# Patient Record
Sex: MALE | Race: WHITE | NOT HISPANIC OR LATINO | Employment: FULL TIME | URBAN - METROPOLITAN AREA
[De-identification: names, ages, dates, MRNs, and addresses within clinical notes are randomized per-mention and may not be internally consistent; named-entity substitution may affect disease eponyms.]

---

## 2019-09-09 ENCOUNTER — CONSULT (OUTPATIENT)
Dept: CARDIOLOGY | Facility: CLINIC | Age: 54
End: 2019-09-09

## 2019-09-09 VITALS
DIASTOLIC BLOOD PRESSURE: 68 MMHG | SYSTOLIC BLOOD PRESSURE: 110 MMHG | BODY MASS INDEX: 37.09 KG/M2 | HEART RATE: 68 BPM | WEIGHT: 304.6 LBS | HEIGHT: 76 IN

## 2019-09-09 DIAGNOSIS — R00.2 PALPITATIONS: Primary | ICD-10-CM

## 2019-09-09 DIAGNOSIS — E78.2 MIXED HYPERLIPIDEMIA: ICD-10-CM

## 2019-09-09 DIAGNOSIS — I10 ESSENTIAL HYPERTENSION: ICD-10-CM

## 2019-09-09 PROCEDURE — 99244 OFF/OP CNSLTJ NEW/EST MOD 40: CPT | Performed by: INTERNAL MEDICINE

## 2019-09-09 RX ORDER — CABERGOLINE 0.5 MG/1
0.5 TABLET ORAL 2 TIMES WEEKLY
Refills: 0 | COMMUNITY
Start: 2019-08-01 | End: 2022-03-21

## 2019-09-09 RX ORDER — LOSARTAN POTASSIUM 100 MG/1
100 TABLET ORAL DAILY
Refills: 1 | COMMUNITY
Start: 2019-06-10

## 2019-09-09 RX ORDER — TESTOSTERONE CYPIONATE 200 MG/ML
200 INJECTION, SOLUTION INTRAMUSCULAR 2 TIMES WEEKLY
Refills: 5 | COMMUNITY
Start: 2019-08-08 | End: 2022-03-21

## 2019-09-09 NOTE — PROGRESS NOTES
[facility] AT [address], [address]  [place], [place], [address]    [phone]  WWW.[facility]           OUTPATIENT CLINIC CONSULTATION NOTE    Patient care team:  Patient Care Team:  Josh Jain MD as PCP - General  Duc Chino MD as Surgeon (Neurosurgery)    Requesting Provider and Reason for consultation: The patient is being seen today at the request of Josh Jain MD for palpitations.     Subjective:   Chief complaint:   Chief Complaint   Patient presents with   • Palpitations     Consult   • Shortness of Breath       HPI:    Moe Hays is a 53 y.o. male.  The patient has a history of hypertension, hyperlipidemia, obstructive sleep apnea, and chronic lower back pain.  He presents today for consultation.    He reports that for at least the last 7 years he has had intermittent episodes of palpitations.  He notes that these occur on average once every couple of weeks.  He also reports chronic fatigue, intermittent centralized achy chest pain that occurs with or without exertion, and lower extremity edema that is improved with elevation.  He reports that 2 of his brothers have been diagnosed with atrial fibrillation who have a similar health history as his.  He also notes that his dad had aortic stenosis.  He typically drinks alcohol on the weekends and consumes multiple drinks at one time.  He denies tobacco or drug use.  He did undergo a treadmill stress test approximately 20 years ago when he first developed hypertension.    Review of Systems:  Positive for fatigue, palpitations, atypical chest pain, lower extremity edema  Negative for dyspnea with exertion, orthopnea, PND, lightheadedness, syncope.   All other systems reviewed and are negative.    PFSH:  Patient Active Problem List   Diagnosis   • Degenerative disc disease, lumbar   • Lumbar radiculopathy   • Moderate obesity   • Physical deconditioning   • Palpitations   • Essential hypertension   •  "Mixed hyperlipidemia         Current Outpatient Medications:   •  cabergoline (DOSTINEX) 0.5 MG tablet, TAKE 1 TABLET BY MOUTH TWO TIMES A WEEK, Disp: , Rfl: 0  •  esomeprazole (NexIUM) 40 MG capsule, Take  by mouth., Disp: , Rfl:   •  fenofibrate (TRICOR) 145 MG tablet, Take  by mouth daily., Disp: , Rfl:   •  losartan (COZAAR) 100 MG tablet, Take 100 mg by mouth Daily. 200001, Disp: , Rfl: 1  •  metFORMIN (GLUCOPHAGE) 500 MG tablet, TAKE 1 TABLET BY MOUTH ONCE daily FOR 10 DAYS, THEN INCREASE TO 1 TABLET twice DAILY THEREAFTER, Disp: , Rfl: 5  •  NON FORMULARY, b-12 energy injection weekly, Disp: , Rfl:   •  omega-3 acid ethyl esters (LOVAZA) 1 G capsule, Take  by mouth 2 (two) times a day., Disp: , Rfl:   •  pravastatin (PRAVACHOL) 80 MG tablet, Take  by mouth daily., Disp: , Rfl:   •  Testosterone Cypionate (DEPOTESTOTERONE CYPIONATE) 200 MG/ML injection, INJECT 0.7 ML INTRAMUSCULARLY TWICE WEEKLY, Disp: , Rfl: 5    Allergies   Allergen Reactions   • No Known Drug Allergy        Social History     Socioeconomic History   • Marital status:      Spouse name: Not on file   • Number of children: Not on file   • Years of education: Not on file   • Highest education level: Not on file   Tobacco Use   • Smoking status: Never Smoker   • Smokeless tobacco: Former User     Types: Chew   Substance and Sexual Activity   • Alcohol use: Yes     Comment: Moderate Alcohol use   • Drug use: No   • Sexual activity: Defer     Family History   Problem Relation Age of Onset   • Heart disease Father    • Aortic stenosis Father    • Heart disease Mother    • Atrial fibrillation Brother    • Atrial fibrillation Brother          Objective:   Physical Exam:  /68 (BP Location: Right arm, Patient Position: Sitting)   Pulse 68   Ht 193 cm (76\")   Wt (!) 138 kg (304 lb 9.6 oz)   BMI 37.08 kg/m²   CONSTITUTIONAL: Well-nourished. In no acute distress.   SKIN: Warm and dry. No rashes noted  HEENT: Head is normocephalic and " atraumatic.  Mucous membranes are pink and moist.   NECK: Supple without masses or thyromegaly. There is no jugular venous distention at 30°.  LUNGS: Normal effort. Clear to auscultation bilaterally without wheezing, rhonchi, or rales noted.   CARDIOVASCULAR: The heart has a regular rate and rhythm with a normal S1 and S2. There is no murmur, gallop, rub, or click appreciated.   PERIPHERAL VASCULAR: Carotid upstroke is 2+ bilaterally and without bruits. Radial pulses are 2+ bilaterally. There is no lower extremity edema.   ABDOMEN: Normal bowel sounds.  Soft with no tenderness with palpitation. No hepatosplenomegaly  MUSCULOSKELETAL: Normal gait. No digital cyanosis  NEUROLOGICAL: CN II - XII grossly intact  PSYCHIATRIC: Alert, orientated x 3, appropriate affect     Labs:  BUN   Date Value Ref Range Status   02/12/2014 11 6 - 20 mg/dL Final     Creatinine   Date Value Ref Range Status   02/12/2014 0.9 0.6 - 1.3 mg/dL Final     Potassium   Date Value Ref Range Status   02/12/2014 3.8 3.4 - 5.4 mmol/L Final     ALT (SGPT)   Date Value Ref Range Status   02/12/2014 34 7 - 40 Units/L Final     AST (SGOT)   Date Value Ref Range Status   02/12/2014 26 8 - 33 Units/L Final     No results found for: WBC, HGB, HCT, PLT    No results found for: CHOL  Lab Results   Component Value Date    TRIG 1,030 (H) 02/12/2014     Lab Results   Component Value Date    HDL 40 02/12/2014     Lab Results   Component Value Date    LDL 54 02/12/2014     No components found for: LDLDIRECTC    Outside labs 8/29/2019  BUN 9  Creatinine 1.29  Sodium 142  Potassium 4.8  ALT 22  AST 22  TSH 1.86  Hematocrit 50  Hemoglobin 17.4  Platelets 272    Diagnostic Data:    Procedures    Assessment and Plan:   Moe was seen today for palpitations and shortness of breath.    Diagnoses and all orders for this visit:    Palpitations  -Intermittent lasting minutes to hours and occurring every couple of weeks.  Significant familial history of atrial fibrillation  as well as untreated obstructive sleep apnea, hypertension.  -30-day MCOT to evaluate for arrhythmia.  -Discussed possible triggers for the palpitations with the patient including undertreated FELICIA, alcohol use.  -Encouraged cardiac exercise    Essential hypertension  -At goal, continue losartan.    Mixed hyperlipidemia  -Management per PCP.  -Continue fenofibrate, Lovaza, and pravastatin    - Return in about 6 months (around 3/9/2020).    Scribed for Benji Dumont MD by Ivelisse Yu, STEWART  . 9/9/2019  4:53 PM    I, Benji Dumont MD, personally performed the services as scribed by the above named individual. I have made any necessary edits and it is both accurate and complete.     Benji Dumont MD, MSc, [facility]  Interventional Cardiology  [facility] at [facility]

## 2019-09-18 ENCOUNTER — TELEPHONE (OUTPATIENT)
Dept: CARDIOLOGY | Facility: CLINIC | Age: 54
End: 2019-09-18

## 2019-09-18 DIAGNOSIS — I48.92 ATRIAL FLUTTER WITH RAPID VENTRICULAR RESPONSE (HCC): Primary | ICD-10-CM

## 2019-09-18 NOTE — TELEPHONE ENCOUNTER
LVM requesting return call to discuss monitor results and recommendations per MJS. Will await return call to discuss:    1. Beginning Toprol XL 25 mg daily  2. EP referral to discuss possible flutter ablation   3. Office will call in one week to discuss palpitations response to Toprol XL

## 2019-09-19 RX ORDER — METOPROLOL SUCCINATE 25 MG/1
25 TABLET, EXTENDED RELEASE ORAL DAILY
Qty: 30 TABLET | Refills: 11 | Status: SHIPPED | OUTPATIENT
Start: 2019-09-19 | End: 2019-10-30 | Stop reason: SDUPTHER

## 2019-09-19 NOTE — TELEPHONE ENCOUNTER
Patient returned call. Discussed recommendations per MJS. Patient to begin Toprol XL mg. Pt advised that we will call him in one week to ask about response to medication. Pt will also be set up with EP to discuss possible flutter ablation.

## 2019-09-26 ENCOUNTER — TELEPHONE (OUTPATIENT)
Dept: CARDIOLOGY | Facility: CLINIC | Age: 54
End: 2019-09-26

## 2019-09-26 NOTE — TELEPHONE ENCOUNTER
Patient contacted to review palpitations after starting Toprol XL 25 mg daily. Pt states that he has not started his because his pharmacy has not let him know it was ready. Pt advised to call [facility] pharmacy. Pt to call office in one week after starting medication. Pt agreeable to plan, all questions answered at this time.

## 2019-10-30 RX ORDER — METOPROLOL SUCCINATE 25 MG/1
25 TABLET, EXTENDED RELEASE ORAL DAILY
Qty: 30 TABLET | Refills: 11 | Status: SHIPPED | OUTPATIENT
Start: 2019-10-30 | End: 2021-03-15 | Stop reason: SDUPTHER

## 2019-12-10 ENCOUNTER — CONSULT (OUTPATIENT)
Dept: CARDIOLOGY | Facility: CLINIC | Age: 54
End: 2019-12-10

## 2019-12-10 VITALS
BODY MASS INDEX: 37.14 KG/M2 | HEART RATE: 56 BPM | WEIGHT: 305 LBS | DIASTOLIC BLOOD PRESSURE: 84 MMHG | SYSTOLIC BLOOD PRESSURE: 122 MMHG | HEIGHT: 76 IN

## 2019-12-10 DIAGNOSIS — I48.3 TYPICAL ATRIAL FLUTTER (HCC): Primary | ICD-10-CM

## 2019-12-10 PROCEDURE — 99204 OFFICE O/P NEW MOD 45 MIN: CPT | Performed by: INTERNAL MEDICINE

## 2019-12-10 PROCEDURE — 93000 ELECTROCARDIOGRAM COMPLETE: CPT | Performed by: INTERNAL MEDICINE

## 2019-12-10 RX ORDER — NIACIN 1000 MG
1000 TABLET, EXTENDED RELEASE 24 HR ORAL NIGHTLY
COMMUNITY

## 2019-12-10 NOTE — PROGRESS NOTES
Moe Hays  1965  PCP: Josh Jain MD    SUBJECTIVE:   Moe Hays is a 54 y.o. male seen for a consultation visit regarding the following:     Chief Complaint:   Chief Complaint   Patient presents with   • Atrial Flutter          Consultation is requested by Benji Dumont MD for evaluation of Atrial Flutter        History:  This is a 54-year-old patient referred by Dr. Dumont for further evaluation and management of atrial flutter and atrial fib.  The patient reports having palpitations for many years.  He has approximately 2-3 episodes a week.  They usually last for a few minutes at a time.  He had a work-up with a monitor that showed less than 1% burden overall however, it did document runs of atrial flutter and occasional atrial fibrillation.      Cardiac PMH: (Old records have been reviewed and summarized below)  1.  Palpitations  2.  Atrial flutter  3.  Atrial fibrillation   4.  Diabetes  5.  Hypertension      Past Medical History, Past Surgical History, Family history, Social History, and Medications were all reviewed with the patient today and updated as necessary.       Current Outpatient Medications:   •  cabergoline (DOSTINEX) 0.5 MG tablet, TAKE 1 TABLET BY MOUTH TWO TIMES A WEEK, Disp: , Rfl: 0  •  esomeprazole (NexIUM) 40 MG capsule, Take  by mouth As Needed., Disp: , Rfl:   •  fenofibrate (TRICOR) 145 MG tablet, Take  by mouth daily., Disp: , Rfl:   •  IPAMORELIN ACETATE IJ, Inject  as directed., Disp: , Rfl:   •  losartan (COZAAR) 100 MG tablet, Take 100 mg by mouth Daily. 200001, Disp: , Rfl: 1  •  metFORMIN (GLUCOPHAGE) 500 MG tablet, TAKE 1 TABLET BY MOUTH ONCE daily FOR 10 DAYS, THEN INCREASE TO 1 TABLET twice DAILY THEREAFTER, Disp: , Rfl: 5  •  metoprolol succinate XL (TOPROL-XL) 25 MG 24 hr tablet, Take 1 tablet by mouth Daily., Disp: 30 tablet, Rfl: 11  •  niacin (NIASPAN) 1000 MG CR tablet, Take 1,000 mg by mouth Every Night., Disp: , Rfl:   •  NON FORMULARY, b-12 energy injection  weekly, Disp: , Rfl:   •  omega-3 acid ethyl esters (LOVAZA) 1 G capsule, Take  by mouth 2 (two) times a day., Disp: , Rfl:   •  pravastatin (PRAVACHOL) 80 MG tablet, Take  by mouth daily., Disp: , Rfl:   •  Testosterone Cypionate (DEPOTESTOTERONE CYPIONATE) 200 MG/ML injection, INJECT 0.7 ML INTRAMUSCULARLY TWICE WEEKLY, Disp: , Rfl: 5    Allergies   Allergen Reactions   • No Known Drug Allergy          Past Medical History:   Diagnosis Date   • Cervical disc disorder    • Cervical radiculopathy    • Chronic pain disorder    • Difficulty walking    • Extremity pain    • Hand weakness     Hand Weakness Right   • History of degenerative disc disease    • History of insomnia    • History of numbness    • History of sciatica    • Hyperlipidemia    • Hypertension    • Joint pain    • Low back pain    • Lumbosacral disc disease    • Osteoarthritis    • Tingling      Past Surgical History:   Procedure Laterality Date   • BACK SURGERY     • CHOLECYSTECTOMY     • NECK SURGERY       Family History   Problem Relation Age of Onset   • Heart disease Father    • Aortic stenosis Father    • Heart disease Mother    • Atrial fibrillation Brother    • Atrial fibrillation Brother      Social History     Tobacco Use   • Smoking status: Never Smoker   • Smokeless tobacco: Former User     Types: Chew   Substance Use Topics   • Alcohol use: Yes     Comment: Moderate Alcohol use       ROS:    General: no recent weight loss/gain, weakness or fatigue  Skin: no rashes, lumps, or other skin changes  HEENT: no dizziness, lightheadedness, or vision changes  Respiratory: no cough or hemoptysis  Cardiovascular: + palpitations, and tachycardia  Gastrointestinal: no black/tarry stools or diarrhea  Urinary: no change in frequency or urgency  Peripheral Vascular: no claudication or leg cramps  Musculoskeletal: no muscle or joint pain/stiffness  Psychiatric: no depression or excessive stress  Neurological: no sensory or motor loss, no  "syncope  Hematologic: no anemia, easy bruising or bleeding  Endocrine: no thyroid problems, nor heat or cold intolerance       PHYSICAL EXAM:   /84 (BP Location: Left arm, Patient Position: Sitting)   Pulse 56   Ht 193 cm (76\")   Wt (!) 138 kg (305 lb)   BMI 37.13 kg/m²      Wt Readings from Last 5 Encounters:   12/10/19 (!) 138 kg (305 lb)   09/09/19 (!) 138 kg (304 lb 9.6 oz)   07/07/16 133 kg (293 lb 12.8 oz)   02/18/16 134 kg (296 lb 4.1 oz)   11/16/15 132 kg (290 lb 4.1 oz)     BP Readings from Last 5 Encounters:   12/10/19 122/84   09/09/19 110/68   07/07/16 124/78   02/18/16 130/85   11/16/15 116/76       General-Well Nourished, Well developed  Eyes - PERRLA  Neck- supple, No mass  CV- regular rate and rhythm, no MRG  Lung- clear bilaterally  Abd- soft, +BS  Musc/skel - Norm strength and range of motion  Skin- warm and dry  Neuro - Alert & Oriented x 3, appropriate mood.    Medical problems and test results were reviewed with the patient today.     Results for orders placed or performed in visit on 02/12/14   Testosterone   Result Value Ref Range    Testosterone, Total 123 ng/dL   Comprehensive metabolic panel   Result Value Ref Range    Glucose 94 70 - 100 mg/dL    BUN 11 6 - 20 mg/dL    Creatinine 0.9 0.6 - 1.3 mg/dL    Sodium 141 136 - 145 mmol/L    Potassium 3.8 3.4 - 5.4 mmol/L    Chloride 105 98 - 107 mmol/L    CO2 28 20 - 31 mmol/L    Calcium 9.2 8.7 - 10.4 mg/dL    Alkaline Phosphatase 72 25 - 100 Units/L    AST (SGOT) 26 8 - 33 Units/L    ALT (SGPT) 34 7 - 40 Units/L    Total Bilirubin 0.5 0.3 - 1.2 mg/dL    Total Protein 7.1 6.4 - 8.3 g/dL    Albumin 3.9 3.4 - 4.8 g/dL    eGFR 98 ml/min/1.732    Anion Gap 9 3 - 11 mmol/L   Lipid panel   Result Value Ref Range    Total Cholesterol 346 (H) 0 - 200 mg/dL    Triglycerides 1,030 (H) 0 - 150 mg/dL    HDL Cholesterol 40 40 - 60 mg/dL    LDL Cholesterol  54 0 - 100 mg/dL         Lab Results   Component Value Date    HDL 40 02/12/2014    LDL 54 " 02/12/2014       EKG:  (EKG/Tracing has been independently visualized by me and summarized below)      ECG 12 Lead  Date/Time: 12/10/2019 9:57 AM  Performed by: John Wylie MD  Authorized by: John Wylie MD   Comparison: compared with previous ECG   Similar to previous ECG  Rhythm: sinus bradycardia  Rate: bradycardic  BPM: 56  ST Segments: ST segments normal  T Waves: T waves normal    Clinical impression: normal ECG            ASSESSMENT and PLAN  1.  Atrial flutter-short rare events that he is very symptomatic from.  We will plan for catheter-based ablation  2.  Atrial fibrillation-noted 1 short episode associated with the atrial flutter readdress burden after atrial flutter ablation  3.  Stroke prophylaxis-his overall burden is low therefore it is felt that the risk of full anti-coagulation at this time outweighs the benefit.    Return for after procedure.    1. A. Flutter ablation  2. No Meds to hold         John Wylie M.D., F.A.C.C, F.H.R.S.  Cardiology/Electrophysiology  12/10/19  9:57 AM

## 2019-12-16 PROBLEM — I48.3 TYPICAL ATRIAL FLUTTER (HCC): Status: ACTIVE | Noted: 2019-12-16

## 2020-01-09 ENCOUNTER — PREP FOR SURGERY (OUTPATIENT)
Dept: OTHER | Facility: HOSPITAL | Age: 55
End: 2020-01-09

## 2020-01-12 ENCOUNTER — APPOINTMENT (OUTPATIENT)
Dept: PREADMISSION TESTING | Facility: HOSPITAL | Age: 55
End: 2020-01-12

## 2020-01-12 DIAGNOSIS — I48.3 TYPICAL ATRIAL FLUTTER (HCC): ICD-10-CM

## 2020-01-12 LAB
ALBUMIN SERPL-MCNC: 4.4 G/DL (ref 3.5–5.2)
ALBUMIN/GLOB SERPL: 1.5 G/DL
ALP SERPL-CCNC: 45 U/L (ref 39–117)
ALT SERPL W P-5'-P-CCNC: 27 U/L (ref 1–41)
ANION GAP SERPL CALCULATED.3IONS-SCNC: 12 MMOL/L (ref 5–15)
AST SERPL-CCNC: 31 U/L (ref 1–40)
BILIRUB SERPL-MCNC: 0.6 MG/DL (ref 0.2–1.2)
BUN BLD-MCNC: 10 MG/DL (ref 6–20)
BUN/CREAT SERPL: 8.1 (ref 7–25)
CALCIUM SPEC-SCNC: 9.7 MG/DL (ref 8.6–10.5)
CHLORIDE SERPL-SCNC: 103 MMOL/L (ref 98–107)
CO2 SERPL-SCNC: 26 MMOL/L (ref 22–29)
CREAT BLD-MCNC: 1.23 MG/DL (ref 0.76–1.27)
DEPRECATED RDW RBC AUTO: 45.1 FL (ref 37–54)
ERYTHROCYTE [DISTWIDTH] IN BLOOD BY AUTOMATED COUNT: 13.1 % (ref 12.3–15.4)
GFR SERPL CREATININE-BSD FRML MDRD: 61 ML/MIN/1.73
GLOBULIN UR ELPH-MCNC: 3 GM/DL
GLUCOSE BLD-MCNC: 124 MG/DL (ref 65–99)
HCT VFR BLD AUTO: 49.2 % (ref 37.5–51)
HGB BLD-MCNC: 16.6 G/DL (ref 13–17.7)
MCH RBC QN AUTO: 31.7 PG (ref 26.6–33)
MCHC RBC AUTO-ENTMCNC: 33.7 G/DL (ref 31.5–35.7)
MCV RBC AUTO: 93.9 FL (ref 79–97)
PLATELET # BLD AUTO: 235 10*3/MM3 (ref 140–450)
PMV BLD AUTO: 11.1 FL (ref 6–12)
POTASSIUM BLD-SCNC: 4.2 MMOL/L (ref 3.5–5.2)
PROT SERPL-MCNC: 7.4 G/DL (ref 6–8.5)
RBC # BLD AUTO: 5.24 10*6/MM3 (ref 4.14–5.8)
SODIUM BLD-SCNC: 141 MMOL/L (ref 136–145)
WBC NRBC COR # BLD: 7.03 10*3/MM3 (ref 3.4–10.8)

## 2020-01-12 PROCEDURE — 80053 COMPREHEN METABOLIC PANEL: CPT | Performed by: INTERNAL MEDICINE

## 2020-01-12 PROCEDURE — 36415 COLL VENOUS BLD VENIPUNCTURE: CPT

## 2020-01-12 PROCEDURE — 85027 COMPLETE CBC AUTOMATED: CPT | Performed by: INTERNAL MEDICINE

## 2020-01-12 RX ORDER — FENOFIBRATE 145 MG/1
145 TABLET, COATED ORAL DAILY
Status: ON HOLD | COMMUNITY
End: 2020-01-13 | Stop reason: SDUPTHER

## 2020-01-12 NOTE — DISCHARGE INSTRUCTIONS

## 2020-01-13 ENCOUNTER — HOSPITAL ENCOUNTER (OUTPATIENT)
Facility: HOSPITAL | Age: 55
Discharge: HOME | End: 2020-01-13
Attending: INTERNAL MEDICINE | Admitting: INTERNAL MEDICINE

## 2020-01-13 VITALS
TEMPERATURE: 98.3 F | HEART RATE: 67 BPM | BODY MASS INDEX: 37.53 KG/M2 | RESPIRATION RATE: 12 BRPM | OXYGEN SATURATION: 95 % | HEIGHT: 76 IN | SYSTOLIC BLOOD PRESSURE: 148 MMHG | WEIGHT: 308.2 LBS | DIASTOLIC BLOOD PRESSURE: 92 MMHG

## 2020-01-13 DIAGNOSIS — I48.3 TYPICAL ATRIAL FLUTTER (HCC): ICD-10-CM

## 2020-01-13 PROCEDURE — C1894 INTRO/SHEATH, NON-LASER: HCPCS | Performed by: INTERNAL MEDICINE

## 2020-01-13 PROCEDURE — 25010000002 FENTANYL CITRATE (PF) 100 MCG/2ML SOLUTION: Performed by: INTERNAL MEDICINE

## 2020-01-13 PROCEDURE — C1731 CATH, EP, 20 OR MORE ELEC: HCPCS | Performed by: INTERNAL MEDICINE

## 2020-01-13 PROCEDURE — 93609 INTRA-VNTR MAPG TCHYCAR SITE: CPT | Performed by: INTERNAL MEDICINE

## 2020-01-13 PROCEDURE — S0260 H&P FOR SURGERY: HCPCS | Performed by: INTERNAL MEDICINE

## 2020-01-13 PROCEDURE — 99152 MOD SED SAME PHYS/QHP 5/>YRS: CPT | Performed by: INTERNAL MEDICINE

## 2020-01-13 PROCEDURE — 93653 COMPRE EP EVAL TX SVT: CPT | Performed by: INTERNAL MEDICINE

## 2020-01-13 PROCEDURE — 93621 COMP EP EVL L PAC&REC C SINS: CPT | Performed by: INTERNAL MEDICINE

## 2020-01-13 PROCEDURE — 93005 ELECTROCARDIOGRAM TRACING: CPT | Performed by: INTERNAL MEDICINE

## 2020-01-13 PROCEDURE — 25010000002 MIDAZOLAM PER 1 MG: Performed by: INTERNAL MEDICINE

## 2020-01-13 PROCEDURE — 99153 MOD SED SAME PHYS/QHP EA: CPT | Performed by: INTERNAL MEDICINE

## 2020-01-13 PROCEDURE — C1730 CATH, EP, 19 OR FEW ELECT: HCPCS | Performed by: INTERNAL MEDICINE

## 2020-01-13 PROCEDURE — C1733 CATH, EP, OTHR THAN COOL-TIP: HCPCS | Performed by: INTERNAL MEDICINE

## 2020-01-13 RX ORDER — ACETAMINOPHEN 650 MG/1
650 SUPPOSITORY RECTAL EVERY 4 HOURS PRN
Status: DISCONTINUED | OUTPATIENT
Start: 2020-01-13 | End: 2020-01-13 | Stop reason: HOSPADM

## 2020-01-13 RX ORDER — ONDANSETRON 2 MG/ML
4 INJECTION INTRAMUSCULAR; INTRAVENOUS EVERY 6 HOURS PRN
Status: DISCONTINUED | OUTPATIENT
Start: 2020-01-13 | End: 2020-01-13 | Stop reason: HOSPADM

## 2020-01-13 RX ORDER — MIDAZOLAM HYDROCHLORIDE 1 MG/ML
INJECTION INTRAMUSCULAR; INTRAVENOUS AS NEEDED
Status: DISCONTINUED | OUTPATIENT
Start: 2020-01-13 | End: 2020-01-13 | Stop reason: HOSPADM

## 2020-01-13 RX ORDER — IBUPROFEN 200 MG
400 TABLET ORAL EVERY 6 HOURS PRN
Status: DISCONTINUED | OUTPATIENT
Start: 2020-01-13 | End: 2020-01-13 | Stop reason: HOSPADM

## 2020-01-13 RX ORDER — OXYCODONE HYDROCHLORIDE AND ACETAMINOPHEN 5; 325 MG/1; MG/1
1 TABLET ORAL EVERY 4 HOURS PRN
Status: DISCONTINUED | OUTPATIENT
Start: 2020-01-13 | End: 2020-01-13 | Stop reason: HOSPADM

## 2020-01-13 RX ORDER — FENTANYL CITRATE 50 UG/ML
INJECTION, SOLUTION INTRAMUSCULAR; INTRAVENOUS AS NEEDED
Status: DISCONTINUED | OUTPATIENT
Start: 2020-01-13 | End: 2020-01-13 | Stop reason: HOSPADM

## 2020-01-13 RX ORDER — ACETAMINOPHEN 325 MG/1
650 TABLET ORAL EVERY 4 HOURS PRN
Status: DISCONTINUED | OUTPATIENT
Start: 2020-01-13 | End: 2020-01-13 | Stop reason: HOSPADM

## 2020-01-13 RX ORDER — LIDOCAINE HYDROCHLORIDE 10 MG/ML
INJECTION, SOLUTION EPIDURAL; INFILTRATION; INTRACAUDAL; PERINEURAL AS NEEDED
Status: DISCONTINUED | OUTPATIENT
Start: 2020-01-13 | End: 2020-01-13 | Stop reason: HOSPADM

## 2020-01-13 NOTE — H&P
[facility] at [facility]  CARDIOLOGY CONSULTATION NOTE    Moe Hays  : 1965  MRN:[id number]    Date of Admission:2020  Date of Consultation: 20    PCP: Josh Jain MD    IDENTIFICATION: A 54 y.o. male     Cc:  Aflutter    PROBLEM LIST:   1.  Palpitations  2.  Atrial flutter  3.  Atrial fibrillation   4.  Diabetes  5.  Hypertension  6.  FELICIA,  CPAP       ALLERGIES:   Allergies   Allergen Reactions   • Other Swelling     Cats, eye swellinig   • No Known Drug Allergy        HOME MEDICINES:   Outpatient Medications    cabergoline (DOSTINEX) 0.5 MG tablet     fenofibrate (TRICOR) 145 MG tablet     losartan (COZAAR) 100 MG tablet     metFORMIN (GLUCOPHAGE) 500 MG tablet     metoprolol succinate XL (TOPROL-XL) 25 MG 24 hr tablet     niacin (NIASPAN) 1000 MG CR tablet     omega-3 acid ethyl esters (LOVAZA) 1 G capsule     pravastatin (PRAVACHOL) 80 MG tablet     esomeprazole (NexIUM) 40 MG capsule     IPAMORELIN ACETATE IJ     NON FORMULARY     Testosterone Cypionate (DEPOTESTOTERONE CYPIONATE) 200 MG/ML injection          HPI:   54-year-old white male presents today to [facility] for EP study and radiofrequency ablation of atrial flutter.  The patient began having palpitations months ago underwent evaluation with Dr. Thornton which included a monitor.  The monitor revealed 1% burden of atrial flutter.  Patient was symptomatic with significant palpitations.  Reports she still having these once or twice every few days.  They are very short in duration but are bothersome to him.  He has not had any chest pain or chest tightness suggesting his pectoris.  He denies any strokelike symptoms.  Denies any recent hospitalizations.  Dr. Wylie discussed with the patient on December 10, 2019 options for treatment such as ablation the patient agreed to proceed.      ROS: All systems have been reviewed and are negative with the exception of those mentioned in the HPI and  "problem list above.    Surgical History:   Past Surgical History:   Procedure Laterality Date   • CHOLECYSTECTOMY     • COLONOSCOPY     • ENDOSCOPY     • NECK SURGERY         Social History:   Social History     Socioeconomic History   • Marital status:      Spouse name: Not on file   • Number of children: Not on file   • Years of education: Not on file   • Highest education level: Not on file   Tobacco Use   • Smoking status: Never Smoker   • Smokeless tobacco: Former User     Types: Chew   Substance and Sexual Activity   • Alcohol use: Yes     Alcohol/week: 10.0 standard drinks     Types: 10 Cans of beer per week     Comment: Moderate Alcohol use   • Drug use: No   • Sexual activity: Defer       Family History:   Family History   Problem Relation Age of Onset   • Heart disease Father    • Aortic stenosis Father    • Heart disease Mother    • Atrial fibrillation Brother    • Atrial fibrillation Brother        Objective     /76 (BP Location: Left arm, Patient Position: Lying)   Pulse 62   Temp 98.3 °F (36.8 °C) (Tympanic)   Resp 16   Ht 193 cm (76\")   Wt (!) 140 kg (308 lb 3.3 oz)   SpO2 97%   BMI 37.52 kg/m²   No intake or output data in the 24 hours ending 01/13/20 1037    PHYSICAL EXAM:  Constitutional:  Well-nourished, cooperative, in no acute distress.   Head:  Normocephalic, without obvious abnormality, atraumatic.   Neck: No adenopathy, supple, trachea midline, no thyromegaly, no    carotid bruit, no JVD.   Respiratory:   Clear to auscultation bilaterally; respirations regular, even and unlabored. No wheezes, rales or rhonchi.    Cardiovascular:  Regular rhythm and normal rate, normal S1 and S2, no            murmur, no gallop, no rub, no click.   Pulses: Peripheral pulses are present and equal bilaterally.   GI:   Soft, non-distended. Bowel sounds heard throughout. No organomegaly or masses. Non-tender to palpation, no guarding.   Extremities: No edema, clubbing or cyanosis.   Skin: Skin " is warm and dry. No bleeding, bruising or rash.   Neurological: Alert, oriented to time, person and place. No focal deficits.     Labs/Diagnostic Data  Results from last 7 days   Lab Units 01/12/20  1027   SODIUM mmol/L 141   POTASSIUM mmol/L 4.2   CHLORIDE mmol/L 103   CO2 mmol/L 26.0   BUN mg/dL 10   CREATININE mg/dL 1.23   GLUCOSE mg/dL 124*   CALCIUM mg/dL 9.7         Results from last 7 days   Lab Units 01/12/20  1027   WBC 10*3/mm3 7.03   HEMOGLOBIN g/dL 16.6   HEMATOCRIT % 49.2   PLATELETS 10*3/mm3 235                               I personally reviewed the patient's EKG/Telemetry data  Sinus Bradycardia.       Current Medications:        No current facility-administered medications for this encounter.     Assessment and Plan:     1. Symptotic aflutter  2. PAF: Minimal events.   3. HTN:  Controlled, Cozaar, Toprol.   4. Obesity, Need to  Diet and exercise.   5. Chadsvasc=2.   6. DM Type II      PLAN:   EPS and RFA of Aflutter.  We discussed the procedure in great detail including risks, benefits, and alternatives. The patient understands that risks include bleeding, infection, stroke, MI, cardiac perforation, and even death.  Electronically signed by MANOJ Prabhakar, 01/13/20, 10:03 AM.

## 2020-01-13 NOTE — PROCEDURES
PRE-ELECTROPHYSIOLOGY STUDY DIAGNOSES  1. Typical atrial flutter.     PROCEDURE PERFORMED  1. Electrophysiology testing with right-sided atrial flutter ablation.  2. Left atrial pacing recording from the coronary sinus.  3. Interatrial mapping.  4. Sedation    Anesthesia:    I was present with the patient for the duration of moderate sedation and supervised staff who had no other duties and monitored the patient for the entire procedure     Name of independent trained observer: Mckenna Perez RN  Intra-Service start time: 1423  Intra-Service end time: 1523     Estimated Blood Loss: Less than 10 mL     Specimens: None     PROCEDURE IN DETAIL: The patient was brought into the EP lab in a fasting  state. The right and left groins were prepped and draped in the usual  sterile fashion. Access was obtained in the right femoral vein via the  Seldinger technique over which an 8 and 7-Bhutanese sheath was placed.  Access was obtained in the left femoral vein via the Seldinger technique  over which a 5-Bhutanese sheath was placed. Through the 7-Bhutanese sheath, a  Halo mapping catheter was placed in the high right atrium. Through the  5-Bhutanese sheath, a 5-Bhutanese catheter was placed at the RV apex. Through  the 8-Bhutanese sheath, a large curved 8 mm Blazer II ablation catheter was  placed in the coronary sinus. Pacing, recording and mapping from the left atrium was done. Pacing across the isthmus was done pre-ablation. A Right-sided atrial  flutter line was then performed using 70 W of energy, 60 degree heat for  120 seconds. Multiple were placed secondary to hypertrophy.  Ablation  catheter was then placed back into the coronary sinus. Pacing from the  coronary sinus showed bidirectional block across the tricuspid annulus.  The His bundle was then mapped out and formal electrophysiology test was  performed.     1. Baseline rhythm showed normal sinus heart rhythm with an R-R interval of 750,   2. TN interval of 161,   3. QRS of 103,    4. QT of 404,   5. AH of 82,   6. HV of 44.    7. Sinus node recovery time at 600 was 1021 at 400 was 1224.   8. The AV Wenckebach cycle length was 430.   9. AV node refractory period at 600 was 380,   10. The VA Wenckebach cycle length was 530.   11. VA node refractory period at 600 was 370,   12. The ventricular effective refractory period at 600 was 190, at 400 was 190.     The sheaths were then pulled. Hemostasis was achieved. The patient recovered from his sedation, transferred from the lab in a stable condition.     IMPRESSION:   1. Successful catheter mapping ablation of right-sided isthmus-dependent atrial flutter substrate  2. Additional ablation lesions were required secondary to hypertrophy

## 2020-02-11 ENCOUNTER — OFFICE VISIT (OUTPATIENT)
Dept: CARDIOLOGY | Facility: CLINIC | Age: 55
End: 2020-02-11

## 2020-02-11 VITALS
HEIGHT: 76 IN | WEIGHT: 313.8 LBS | SYSTOLIC BLOOD PRESSURE: 138 MMHG | DIASTOLIC BLOOD PRESSURE: 88 MMHG | HEART RATE: 78 BPM | BODY MASS INDEX: 38.21 KG/M2 | OXYGEN SATURATION: 94 %

## 2020-02-11 DIAGNOSIS — I48.3 TYPICAL ATRIAL FLUTTER (HCC): Primary | ICD-10-CM

## 2020-02-11 DIAGNOSIS — I10 ESSENTIAL HYPERTENSION: ICD-10-CM

## 2020-02-11 PROCEDURE — 93000 ELECTROCARDIOGRAM COMPLETE: CPT | Performed by: INTERNAL MEDICINE

## 2020-02-11 PROCEDURE — 99213 OFFICE O/P EST LOW 20 MIN: CPT | Performed by: INTERNAL MEDICINE

## 2020-02-11 NOTE — PROGRESS NOTES
Moe Hays  1965  PCP: Josh Jain MD    SUBJECTIVE:   Meo Hays is a 54 y.o. male seen for a consultation visit regarding the following:     Chief Complaint:   Chief Complaint   Patient presents with   • Atrial Flutter          HPI:    Patient has been cardiac stable. No Further A. Fib or flutter events.     History:  This is a 54-year-old patient referred by Dr. Dumont for further evaluation and management of atrial flutter and atrial fib.  The patient reports having palpitations for many years.  He has approximately 2-3 episodes a week.  They usually last for a few minutes at a time.  He had a work-up with a monitor that showed less than 1% burden overall however, it did document runs of atrial flutter and occasional atrial fibrillation.      Cardiac PMH: (Old records have been reviewed and summarized below)  1.  Palpitations  2.  Atrial flutter  3.  Atrial fibrillation   4.  Diabetes  5.  Hypertension      Past Medical History, Past Surgical History, Family history, Social History, and Medications were all reviewed with the patient today and updated as necessary.       Current Outpatient Medications:   •  cabergoline (DOSTINEX) 0.5 MG tablet, Take 0.5 mg by mouth 2 (Two) Times a Week., Disp: , Rfl: 0  •  esomeprazole (NexIUM) 40 MG capsule, Take 40 mg by mouth As Needed., Disp: , Rfl:   •  fenofibrate (TRICOR) 145 MG tablet, Take 145 mg by mouth Daily., Disp: , Rfl:   •  IPAMORELIN ACETATE IJ, Inject 0.01 mL as directed. Once a day, 5 times a week.  Monday-Friday, Disp: , Rfl:   •  losartan (COZAAR) 100 MG tablet, Take 100 mg by mouth Daily. 200001, Disp: , Rfl: 1  •  metFORMIN (GLUCOPHAGE) 500 MG tablet, Take 500 mg by mouth 2 (Two) Times a Day With Meals., Disp: , Rfl: 5  •  metoprolol succinate XL (TOPROL-XL) 25 MG 24 hr tablet, Take 1 tablet by mouth Daily., Disp: 30 tablet, Rfl: 11  •  niacin (NIASPAN) 1000 MG CR tablet, Take 1,000 mg by mouth Every Night., Disp: , Rfl:   •  NON  FORMULARY, 1 (One) Time Per Week. b-12 energy injection weekly , Disp: , Rfl:   •  omega-3 acid ethyl esters (LOVAZA) 1 G capsule, Take 1 g by mouth 2 (Two) Times a Day., Disp: , Rfl:   •  pravastatin (PRAVACHOL) 80 MG tablet, Take 80 mg by mouth Every Night., Disp: , Rfl:   •  Testosterone Cypionate (DEPOTESTOTERONE CYPIONATE) 200 MG/ML injection, Inject 200 mg into the appropriate muscle as directed by prescriber 2 (Two) Times a Week., Disp: , Rfl: 5    Allergies   Allergen Reactions   • Other Swelling     Cats, eye swellinig         Past Medical History:   Diagnosis Date   • Atrial flutter (CMS/HCC)    • Chronic pain disorder    • Elevated cholesterol    • Extremity pain    • GERD (gastroesophageal reflux disease)    • History of degenerative disc disease    • History of sciatica    • Hyperlipidemia    • Hypertension    • Joint pain    • Low back pain    • Lumbosacral disc disease    • MRSA (methicillin resistant staph aureus) culture positive     required IV abx and PICC line after neck surgery   • Osteoarthritis    • Sleep apnea     wears CPAP every night     Past Surgical History:   Procedure Laterality Date   • CARDIAC ELECTROPHYSIOLOGY PROCEDURE N/A 1/13/2020    Procedure: Ablation atrial flutter;  Surgeon: John Wylie MD;  Location: [facility] INVASIVE LOCATION;  Service: Cardiovascular   • CHOLECYSTECTOMY     • COLONOSCOPY     • ENDOSCOPY     • NECK SURGERY       Family History   Problem Relation Age of Onset   • Heart disease Father    • Aortic stenosis Father    • Heart disease Mother    • Atrial fibrillation Brother    • Atrial fibrillation Brother      Social History     Tobacco Use   • Smoking status: Never Smoker   • Smokeless tobacco: Former User     Types: Chew   Substance Use Topics   • Alcohol use: Yes     Alcohol/week: 10.0 standard drinks     Types: 10 Cans of beer per week     Comment: Moderate Alcohol use            PHYSICAL EXAM:   /88 (BP Location: Left arm, Patient Position:  "Sitting)   Pulse 78   Ht 193 cm (76\")   Wt (!) 142 kg (313 lb 12.8 oz)   SpO2 94%   BMI 38.20 kg/m²      Wt Readings from Last 5 Encounters:   02/11/20 (!) 142 kg (313 lb 12.8 oz)   01/13/20 (!) 140 kg (308 lb 3.3 oz)   12/10/19 (!) 138 kg (305 lb)   09/09/19 (!) 138 kg (304 lb 9.6 oz)   07/07/16 133 kg (293 lb 12.8 oz)     BP Readings from Last 5 Encounters:   02/11/20 138/88   01/13/20 148/92   12/10/19 122/84   09/09/19 110/68   07/07/16 124/78       General-Well Nourished, Well developed  Eyes - PERRLA  Neck- supple, No mass  CV- regular rate and rhythm, no MRG  Lung- clear bilaterally  Abd- soft, +BS  Musc/skel - Norm strength and range of motion  Skin- warm and dry  Neuro - Alert & Oriented x 3, appropriate mood.    Medical problems and test results were reviewed with the patient today.     Results for orders placed or performed in visit on 01/12/20   CBC (No Diff)   Result Value Ref Range    WBC 7.03 3.40 - 10.80 10*3/mm3    RBC 5.24 4.14 - 5.80 10*6/mm3    Hemoglobin 16.6 13.0 - 17.7 g/dL    Hematocrit 49.2 37.5 - 51.0 %    MCV 93.9 79.0 - 97.0 fL    MCH 31.7 26.6 - 33.0 pg    MCHC 33.7 31.5 - 35.7 g/dL    RDW 13.1 12.3 - 15.4 %    RDW-SD 45.1 37.0 - 54.0 fl    MPV 11.1 6.0 - 12.0 fL    Platelets 235 140 - 450 10*3/mm3   Comprehensive Metabolic Panel   Result Value Ref Range    Glucose 124 (H) 65 - 99 mg/dL    BUN 10 6 - 20 mg/dL    Creatinine 1.23 0.76 - 1.27 mg/dL    Sodium 141 136 - 145 mmol/L    Potassium 4.2 3.5 - 5.2 mmol/L    Chloride 103 98 - 107 mmol/L    CO2 26.0 22.0 - 29.0 mmol/L    Calcium 9.7 8.6 - 10.5 mg/dL    Total Protein 7.4 6.0 - 8.5 g/dL    Albumin 4.40 3.50 - 5.20 g/dL    ALT (SGPT) 27 1 - 41 U/L    AST (SGOT) 31 1 - 40 U/L    Alkaline Phosphatase 45 39 - 117 U/L    Total Bilirubin 0.6 0.2 - 1.2 mg/dL    eGFR Non African Amer 61 >60 mL/min/1.73    Globulin 3.0 gm/dL    A/G Ratio 1.5 g/dL    BUN/Creatinine Ratio 8.1 7.0 - 25.0    Anion Gap 12.0 5.0 - 15.0 mmol/L         Lab Results "   Component Value Date    HDL 40 02/12/2014    LDL 54 02/12/2014       EKG:  (EKG/Tracing has been independently visualized by me and summarized below)      ECG 12 Lead  Date/Time: 2/11/2020 9:11 AM  Performed by: John Wylie MD  Authorized by: John Wylie MD   Comparison: compared with previous ECG   Similar to previous ECG  Rhythm: sinus rhythm  Rate: normal  BPM: 72  Other findings: non-specific ST-T wave changes    Clinical impression: abnormal EKG            ASSESSMENT and PLAN  1.  Atrial flutter-Post ablation. Successful catheter mapping ablation of right-sided isthmus-dependent atrial flutter substrate. Additional ablation lesions were required secondary to hypertrophy   2.  Atrial fibrillation-noted 1 short episode associated with the atrial flutter readdress burden after atrial flutter ablation  3.  Stroke prophylaxis-his overall burden is low therefore it is felt that the risk of full anti-coagulation at this time outweighs the benefit.  4.  HTN - Monitor of Metoprolol    No follow-ups on file.          John Wylie M.D., F.A.C.C, F.H.R.S.  Cardiology/Electrophysiology  02/11/20  9:05 AM

## 2020-03-09 ENCOUNTER — OFFICE VISIT (OUTPATIENT)
Dept: CARDIOLOGY | Facility: CLINIC | Age: 55
End: 2020-03-09

## 2020-03-09 VITALS
DIASTOLIC BLOOD PRESSURE: 86 MMHG | SYSTOLIC BLOOD PRESSURE: 136 MMHG | WEIGHT: 306 LBS | HEART RATE: 58 BPM | BODY MASS INDEX: 37.26 KG/M2 | HEIGHT: 76 IN | OXYGEN SATURATION: 92 %

## 2020-03-09 DIAGNOSIS — R00.2 PALPITATIONS: ICD-10-CM

## 2020-03-09 DIAGNOSIS — I10 ESSENTIAL HYPERTENSION: ICD-10-CM

## 2020-03-09 DIAGNOSIS — E78.2 MIXED HYPERLIPIDEMIA: ICD-10-CM

## 2020-03-09 DIAGNOSIS — I48.3 TYPICAL ATRIAL FLUTTER (HCC): Primary | ICD-10-CM

## 2020-03-09 PROCEDURE — 99213 OFFICE O/P EST LOW 20 MIN: CPT | Performed by: INTERNAL MEDICINE

## 2020-03-09 NOTE — PROGRESS NOTES
[facility] AT [address], [address]  [place], [place], [address] [phone]  [email or web address]           OUTPATIENT CLINIC PROGRESS NOTE    Patient care team:  Patient Care Team:  Josh Jain MD as PCP - General  Duc Chino MD as Surgeon (Neurosurgery)  John Wylie MD as Consulting Physician (Cardiology)      Subjective:   Chief complaint:   Chief Complaint   Patient presents with   • Atrial Flutter     f/u       HPI:    Moe Hays is a 54 y.o. male.  Cardiac problem list:  1. Atrial flutter status post right-sided atrial flutter ablation, Dr. Wylie, 1/2020  2. Brief episode of atrial fibrillation noted on heart monitor 2019.  2 brothers with a history of atrial fibrillation  3. Hypertension  4. Hyperlipidemia  5. Obstructive sleep apnea  6. Chronic lower back pain.      He presents today for follow up    Since his ablation he has done well.  Denies recurrent palpitations.  Trying to exercise more and eat better.    Review of Systems:  Negative for chest pain, palpitations, lower extremity swelling dyspnea with exertion, orthopnea, PND, lightheadedness, syncope.       PFSH:  Patient Active Problem List   Diagnosis   • Degenerative disc disease, lumbar   • Lumbar radiculopathy   • Moderate obesity   • Physical deconditioning   • Palpitations   • Essential hypertension   • Mixed hyperlipidemia   • Typical atrial flutter (CMS/HCC)         Current Outpatient Medications:   •  cabergoline (DOSTINEX) 0.5 MG tablet, Take 0.5 mg by mouth 2 (Two) Times a Week., Disp: , Rfl: 0  •  esomeprazole (NexIUM) 40 MG capsule, Take 40 mg by mouth As Needed., Disp: , Rfl:   •  fenofibrate (TRICOR) 145 MG tablet, Take 145 mg by mouth Daily., Disp: , Rfl:   •  IPAMORELIN ACETATE IJ, Inject 0.01 mL as directed. Once a day, 5 times a week.  Monday-Friday, Disp: , Rfl:   •  losartan (COZAAR) 100 MG tablet, Take 100 mg by mouth Daily. 200001, Disp: , Rfl: 1  •  metFORMIN  "(GLUCOPHAGE) 500 MG tablet, Take 500 mg by mouth 2 (Two) Times a Day With Meals., Disp: , Rfl: 5  •  metoprolol succinate XL (TOPROL-XL) 25 MG 24 hr tablet, Take 1 tablet by mouth Daily., Disp: 30 tablet, Rfl: 11  •  niacin (NIASPAN) 1000 MG CR tablet, Take 1,000 mg by mouth Every Night., Disp: , Rfl:   •  NON FORMULARY, 1 (One) Time Per Week. b-12 energy injection weekly , Disp: , Rfl:   •  omega-3 acid ethyl esters (LOVAZA) 1 G capsule, Take 1 g by mouth 2 (Two) Times a Day., Disp: , Rfl:   •  pravastatin (PRAVACHOL) 80 MG tablet, Take 80 mg by mouth Every Night., Disp: , Rfl:   •  Testosterone Cypionate (DEPOTESTOTERONE CYPIONATE) 200 MG/ML injection, Inject 200 mg into the appropriate muscle as directed by prescriber 2 (Two) Times a Week., Disp: , Rfl: 5    Allergies   Allergen Reactions   • Other Swelling     Cats, eye swellinig       Social History     Socioeconomic History   • Marital status:      Spouse name: Not on file   • Number of children: Not on file   • Years of education: Not on file   • Highest education level: Not on file   Tobacco Use   • Smoking status: Never Smoker   • Smokeless tobacco: Former User     Types: Chew   Substance and Sexual Activity   • Alcohol use: Yes     Alcohol/week: 10.0 standard drinks     Types: 10 Cans of beer per week     Comment: Moderate Alcohol use   • Drug use: No   • Sexual activity: Defer     Family History   Problem Relation Age of Onset   • Heart disease Father    • Aortic stenosis Father    • Heart disease Mother    • Atrial fibrillation Brother    • Atrial fibrillation Brother          Objective:   Physical Exam:  /86 (BP Location: Right arm, Patient Position: Sitting)   Pulse 58   Ht 193 cm (76\")   Wt (!) 139 kg (306 lb)   SpO2 92%   BMI 37.25 kg/m²   CONSTITUTIONAL: No acute distress, normal affect  RESPIRATORY: Normal effort. Clear to auscultation bilaterally without wheezing or rales  CARDIOVASCULAR: Regular rate and rhythm with normal S1 " and S2. Without murmur, gallop or rub.  PERIPHERAL VASCULAR: Normal radial pulse on the right        Labs:  BUN   Date Value Ref Range Status   01/12/2020 10 6 - 20 mg/dL Final     Creatinine   Date Value Ref Range Status   01/12/2020 1.23 0.76 - 1.27 mg/dL Final     Potassium   Date Value Ref Range Status   01/12/2020 4.2 3.5 - 5.2 mmol/L Final     ALT (SGPT)   Date Value Ref Range Status   01/12/2020 27 1 - 41 U/L Final     AST (SGOT)   Date Value Ref Range Status   01/12/2020 31 1 - 40 U/L Final     WBC   Date Value Ref Range Status   01/12/2020 7.03 3.40 - 10.80 10*3/mm3 Final     Hemoglobin   Date Value Ref Range Status   01/12/2020 16.6 13.0 - 17.7 g/dL Final     Hematocrit   Date Value Ref Range Status   01/12/2020 49.2 37.5 - 51.0 % Final     Platelets   Date Value Ref Range Status   01/12/2020 235 140 - 450 10*3/mm3 Final       No results found for: CHOL  Lab Results   Component Value Date    TRIG 1,030 (H) 02/12/2014     Lab Results   Component Value Date    HDL 40 02/12/2014     Lab Results   Component Value Date    LDL 54 02/12/2014     No components found for: LDLDIRECTC    Outside labs 8/29/2019  BUN 9  Creatinine 1.29  Sodium 142  Potassium 4.8  ALT 22  AST 22  TSH 1.86  Hematocrit 50  Hemoglobin 17.4  Platelets 272    Diagnostic Data:    Procedures    Remote GXT around 2000: Reportedly negative    Assessment and Plan:   Moe was seen today for palpitations and shortness of breath.    Diagnoses and all orders for this visit:    Atrial flutter  -Status post ablation, continue metoprolol  -We will hold off anticoagulation as previously recommended by electrophysiology  -Follow-up with EP as needed    Essential hypertension  -At goal, continue losartan, metoprolol.    Mixed hyperlipidemia  -Management per PCP.  -Continue fenofibrate, Lovaza, and pravastatin    - Return in about 1 year (around 3/9/2021).    Benji Dumotn MD, MSc, [facility]  Interventional Cardiology  [facility]  [place]

## 2021-01-16 ENCOUNTER — APPOINTMENT (OUTPATIENT)
Dept: GENERAL RADIOLOGY | Facility: HOSPITAL | Age: 56
End: 2021-01-16

## 2021-01-16 ENCOUNTER — APPOINTMENT (OUTPATIENT)
Dept: CT IMAGING | Facility: HOSPITAL | Age: 56
End: 2021-01-16

## 2021-01-16 ENCOUNTER — HOSPITAL ENCOUNTER (EMERGENCY)
Facility: HOSPITAL | Age: 56
Discharge: HOME | End: 2021-01-16
Attending: EMERGENCY MEDICINE | Admitting: EMERGENCY MEDICINE

## 2021-01-16 VITALS
BODY MASS INDEX: 35.92 KG/M2 | WEIGHT: 295 LBS | TEMPERATURE: 97.5 F | RESPIRATION RATE: 19 BRPM | HEART RATE: 42 BPM | OXYGEN SATURATION: 100 % | HEIGHT: 76 IN | DIASTOLIC BLOOD PRESSURE: 61 MMHG | SYSTOLIC BLOOD PRESSURE: 114 MMHG

## 2021-01-16 DIAGNOSIS — K57.92 DIVERTICULITIS: Primary | ICD-10-CM

## 2021-01-16 LAB
ALBUMIN SERPL-MCNC: 4.6 G/DL (ref 3.5–5.2)
ALBUMIN/GLOB SERPL: 1.4 G/DL
ALP SERPL-CCNC: 48 U/L (ref 39–117)
ALT SERPL W P-5'-P-CCNC: 18 U/L (ref 1–41)
ANION GAP SERPL CALCULATED.3IONS-SCNC: 13 MMOL/L (ref 5–15)
AST SERPL-CCNC: 20 U/L (ref 1–40)
BASOPHILS # BLD AUTO: 0.04 10*3/MM3 (ref 0–0.2)
BASOPHILS NFR BLD AUTO: 0.4 % (ref 0–1.5)
BILIRUB SERPL-MCNC: 0.7 MG/DL (ref 0–1.2)
BILIRUB UR QL STRIP: NEGATIVE
BUN SERPL-MCNC: 16 MG/DL (ref 6–20)
BUN/CREAT SERPL: 12.1 (ref 7–25)
CALCIUM SPEC-SCNC: 9.5 MG/DL (ref 8.6–10.5)
CHLORIDE SERPL-SCNC: 91 MMOL/L (ref 98–107)
CLARITY UR: CLEAR
CO2 SERPL-SCNC: 26 MMOL/L (ref 22–29)
COLOR UR: YELLOW
CREAT SERPL-MCNC: 1.32 MG/DL (ref 0.76–1.27)
DEPRECATED RDW RBC AUTO: 38.9 FL (ref 37–54)
EOSINOPHIL # BLD AUTO: 0.02 10*3/MM3 (ref 0–0.4)
EOSINOPHIL NFR BLD AUTO: 0.2 % (ref 0.3–6.2)
ERYTHROCYTE [DISTWIDTH] IN BLOOD BY AUTOMATED COUNT: 12 % (ref 12.3–15.4)
FLUAV AG NPH QL: NEGATIVE
FLUBV AG NPH QL IA: NEGATIVE
GFR SERPL CREATININE-BSD FRML MDRD: 56 ML/MIN/1.73
GLOBULIN UR ELPH-MCNC: 3.4 GM/DL
GLUCOSE SERPL-MCNC: 115 MG/DL (ref 65–99)
GLUCOSE UR STRIP-MCNC: NEGATIVE MG/DL
HCT VFR BLD AUTO: 43.9 % (ref 37.5–51)
HGB BLD-MCNC: 15.8 G/DL (ref 13–17.7)
HGB UR QL STRIP.AUTO: NEGATIVE
HOLD SPECIMEN: NORMAL
IMM GRANULOCYTES # BLD AUTO: 0.04 10*3/MM3 (ref 0–0.05)
IMM GRANULOCYTES NFR BLD AUTO: 0.4 % (ref 0–0.5)
KETONES UR QL STRIP: NEGATIVE
LEUKOCYTE ESTERASE UR QL STRIP.AUTO: NEGATIVE
LIPASE SERPL-CCNC: 25 U/L (ref 13–60)
LYMPHOCYTES # BLD AUTO: 1.25 10*3/MM3 (ref 0.7–3.1)
LYMPHOCYTES NFR BLD AUTO: 11 % (ref 19.6–45.3)
MAGNESIUM SERPL-MCNC: 1.7 MG/DL (ref 1.6–2.6)
MCH RBC QN AUTO: 32.2 PG (ref 26.6–33)
MCHC RBC AUTO-ENTMCNC: 36 G/DL (ref 31.5–35.7)
MCV RBC AUTO: 89.4 FL (ref 79–97)
MONOCYTES # BLD AUTO: 0.73 10*3/MM3 (ref 0.1–0.9)
MONOCYTES NFR BLD AUTO: 6.4 % (ref 5–12)
NEUTROPHILS NFR BLD AUTO: 81.6 % (ref 42.7–76)
NEUTROPHILS NFR BLD AUTO: 9.31 10*3/MM3 (ref 1.7–7)
NITRITE UR QL STRIP: NEGATIVE
NRBC BLD AUTO-RTO: 0 /100 WBC (ref 0–0.2)
PH UR STRIP.AUTO: 6 [PH] (ref 5–8)
PLATELET # BLD AUTO: 254 10*3/MM3 (ref 140–450)
PMV BLD AUTO: 10.4 FL (ref 6–12)
POTASSIUM SERPL-SCNC: 3.4 MMOL/L (ref 3.5–5.2)
PROT SERPL-MCNC: 8 G/DL (ref 6–8.5)
PROT UR QL STRIP: NEGATIVE
RBC # BLD AUTO: 4.91 10*6/MM3 (ref 4.14–5.8)
SARS-COV-2 RNA PNL SPEC NAA+PROBE: NOT DETECTED
SODIUM SERPL-SCNC: 130 MMOL/L (ref 136–145)
SP GR UR STRIP: <=1.005 (ref 1–1.03)
TROPONIN T SERPL-MCNC: <0.01 NG/ML (ref 0–0.03)
UROBILINOGEN UR QL STRIP: NORMAL
WBC # BLD AUTO: 11.39 10*3/MM3 (ref 3.4–10.8)
WHOLE BLOOD HOLD SPECIMEN: NORMAL
WHOLE BLOOD HOLD SPECIMEN: NORMAL

## 2021-01-16 PROCEDURE — 99284 EMERGENCY DEPT VISIT MOD MDM: CPT

## 2021-01-16 PROCEDURE — 93005 ELECTROCARDIOGRAM TRACING: CPT | Performed by: EMERGENCY MEDICINE

## 2021-01-16 PROCEDURE — 83690 ASSAY OF LIPASE: CPT | Performed by: EMERGENCY MEDICINE

## 2021-01-16 PROCEDURE — 85025 COMPLETE CBC W/AUTO DIFF WBC: CPT | Performed by: EMERGENCY MEDICINE

## 2021-01-16 PROCEDURE — 74177 CT ABD & PELVIS W/CONTRAST: CPT

## 2021-01-16 PROCEDURE — 71275 CT ANGIOGRAPHY CHEST: CPT

## 2021-01-16 PROCEDURE — 25010000002 ONDANSETRON PER 1 MG: Performed by: EMERGENCY MEDICINE

## 2021-01-16 PROCEDURE — 81003 URINALYSIS AUTO W/O SCOPE: CPT | Performed by: EMERGENCY MEDICINE

## 2021-01-16 PROCEDURE — 96375 TX/PRO/DX INJ NEW DRUG ADDON: CPT

## 2021-01-16 PROCEDURE — 83735 ASSAY OF MAGNESIUM: CPT | Performed by: EMERGENCY MEDICINE

## 2021-01-16 PROCEDURE — 87804 INFLUENZA ASSAY W/OPTIC: CPT | Performed by: EMERGENCY MEDICINE

## 2021-01-16 PROCEDURE — 80053 COMPREHEN METABOLIC PANEL: CPT | Performed by: EMERGENCY MEDICINE

## 2021-01-16 PROCEDURE — 84484 ASSAY OF TROPONIN QUANT: CPT | Performed by: EMERGENCY MEDICINE

## 2021-01-16 PROCEDURE — 25010000002 IOPAMIDOL 61 % SOLUTION: Performed by: EMERGENCY MEDICINE

## 2021-01-16 PROCEDURE — 96374 THER/PROPH/DIAG INJ IV PUSH: CPT

## 2021-01-16 PROCEDURE — 87635 SARS-COV-2 COVID-19 AMP PRB: CPT | Performed by: EMERGENCY MEDICINE

## 2021-01-16 PROCEDURE — 71045 X-RAY EXAM CHEST 1 VIEW: CPT

## 2021-01-16 RX ORDER — DICYCLOMINE HCL 20 MG
20 TABLET ORAL EVERY 6 HOURS PRN
Qty: 10 TABLET | Refills: 0 | Status: SHIPPED | OUTPATIENT
Start: 2021-01-16 | End: 2022-03-21

## 2021-01-16 RX ORDER — LEVOFLOXACIN 750 MG/1
750 TABLET ORAL ONCE
Status: COMPLETED | OUTPATIENT
Start: 2021-01-16 | End: 2021-01-16

## 2021-01-16 RX ORDER — SODIUM CHLORIDE 0.9 % (FLUSH) 0.9 %
10 SYRINGE (ML) INJECTION AS NEEDED
Status: DISCONTINUED | OUTPATIENT
Start: 2021-01-16 | End: 2021-01-16 | Stop reason: HOSPADM

## 2021-01-16 RX ORDER — METHYLPREDNISOLONE 4 MG/1
TABLET ORAL 2 TIMES DAILY
COMMUNITY
End: 2021-03-15

## 2021-01-16 RX ORDER — METRONIDAZOLE 500 MG/1
500 TABLET ORAL ONCE
Status: COMPLETED | OUTPATIENT
Start: 2021-01-16 | End: 2021-01-16

## 2021-01-16 RX ORDER — BACLOFEN 20 MG/1
20 TABLET ORAL AS NEEDED
COMMUNITY

## 2021-01-16 RX ORDER — METRONIDAZOLE 500 MG/1
500 TABLET ORAL 3 TIMES DAILY
Qty: 30 TABLET | Refills: 0 | Status: SHIPPED | OUTPATIENT
Start: 2021-01-16 | End: 2021-03-15

## 2021-01-16 RX ORDER — ONDANSETRON 2 MG/ML
4 INJECTION INTRAMUSCULAR; INTRAVENOUS ONCE
Status: COMPLETED | OUTPATIENT
Start: 2021-01-16 | End: 2021-01-16

## 2021-01-16 RX ORDER — ONDANSETRON 4 MG/1
4 TABLET, ORALLY DISINTEGRATING ORAL EVERY 8 HOURS PRN
Qty: 12 TABLET | Refills: 0 | Status: SHIPPED | OUTPATIENT
Start: 2021-01-16

## 2021-01-16 RX ORDER — FAMOTIDINE 10 MG/ML
20 INJECTION, SOLUTION INTRAVENOUS ONCE
Status: COMPLETED | OUTPATIENT
Start: 2021-01-16 | End: 2021-01-16

## 2021-01-16 RX ORDER — LEVOFLOXACIN 750 MG/1
750 TABLET ORAL DAILY
Qty: 10 TABLET | Refills: 0 | Status: SHIPPED | OUTPATIENT
Start: 2021-01-16 | End: 2022-03-21

## 2021-01-16 RX ADMIN — IOPAMIDOL 100 ML: 612 INJECTION, SOLUTION INTRAVENOUS at 04:46

## 2021-01-16 RX ADMIN — FAMOTIDINE 20 MG: 10 INJECTION INTRAVENOUS at 03:36

## 2021-01-16 RX ADMIN — ONDANSETRON 4 MG: 2 INJECTION INTRAMUSCULAR; INTRAVENOUS at 03:35

## 2021-01-16 RX ADMIN — SODIUM CHLORIDE 1000 ML: 9 INJECTION, SOLUTION INTRAVENOUS at 03:34

## 2021-01-16 RX ADMIN — METRONIDAZOLE 500 MG: 500 TABLET ORAL at 07:10

## 2021-01-16 RX ADMIN — LEVOFLOXACIN 750 MG: 750 TABLET, FILM COATED ORAL at 07:10

## 2021-01-16 NOTE — ED PROVIDER NOTES
TRIAGE CHIEF COMPLAINT:     Nursing and triage notes reviewed    Chief Complaint   Patient presents with   • Chest Pain      HPI: Moe Hays is a 55 y.o. male who presents to the emergency department complaining of flulike symptoms.  Patient states over the past 4 to 5 days he has experienced severe fatigue like he has had no energy.  He states starting today he has had a slight cough.  He does deny any productive cough or persistent cough but states he has coughed several times today.  He states that earlier in the evening he became lightheaded after standing and felt like he was going to pass out and then had an episode of nausea with vomiting and diarrhea.  He states that now he has some burning in his epigastric abdomen feels like heartburn.  Some slight shortness of breath as well.  Patient states he did some research at home and was very concerned about Covid.    REVIEW OF SYSTEMS: All other systems reviewed and are negative     PAST MEDICAL HISTORY:   Past Medical History:   Diagnosis Date   • Atrial flutter (CMS/HCC)    • Chronic pain disorder    • Elevated cholesterol    • Extremity pain    • GERD (gastroesophageal reflux disease)    • History of degenerative disc disease    • History of sciatica    • Hyperlipidemia    • Hypertension    • Joint pain    • Low back pain    • Lumbosacral disc disease    • MRSA (methicillin resistant staph aureus) culture positive     required IV abx and PICC line after neck surgery   • Osteoarthritis    • Sleep apnea     wears CPAP every night        FAMILY HISTORY:   Family History   Problem Relation Age of Onset   • Heart disease Father    • Aortic stenosis Father    • Heart disease Mother    • Atrial fibrillation Brother    • Atrial fibrillation Brother         SOCIAL HISTORY:   Social History     Socioeconomic History   • Marital status:      Spouse name: Not on file   • Number of children: Not on file   • Years of education: Not on file   • Highest  education level: Not on file   Tobacco Use   • Smoking status: Never Smoker   • Smokeless tobacco: Former User     Types: Chew   Substance and Sexual Activity   • Alcohol use: Yes     Alcohol/week: 10.0 standard drinks     Types: 10 Cans of beer per week     Comment: Moderate Alcohol use   • Drug use: No   • Sexual activity: Defer        SURGICAL HISTORY:   Past Surgical History:   Procedure Laterality Date   • CARDIAC ELECTROPHYSIOLOGY PROCEDURE N/A 1/13/2020    Procedure: Ablation atrial flutter;  Surgeon: John Wylie MD;  Location: [facility] INVAS[facility] LOC[facility];  Service: Cardiovascular   • CHOLECYSTECTOMY     • COLONOSCOPY     • ENDOSCOPY     • NECK SURGERY          CURRENT MEDICATIONS:      Medication List      ASK your doctor about these medications    baclofen 20 MG tablet  Commonly known as: LIORESAL     cabergoline 0.5 MG tablet  Commonly known as: DOSTINEX     esomeprazole 40 MG capsule  Commonly known as: nexIUM     fenofibrate 145 MG tablet  Commonly known as: TRICOR     IPAMORELIN ACETATE IJ     losartan 100 MG tablet  Commonly known as: COZAAR     Lovaza 1 g capsule  Generic drug: omega-3 acid ethyl esters     metFORMIN 500 MG tablet  Commonly known as: GLUCOPHAGE     methylPREDNISolone 4 MG dose pack  Commonly known as: MEDROL     metoprolol succinate XL 25 MG 24 hr tablet  Commonly known as: TOPROL-XL  Take 1 tablet by mouth Daily.     Niaspan 1000 MG CR tablet  Generic drug: niacin     NON FORMULARY     pravastatin 80 MG tablet  Commonly known as: PRAVACHOL     Testosterone Cypionate 200 MG/ML injection  Commonly known as: DEPOTESTOTERONE CYPIONATE             ALLERGIES: Other     PHYSICAL EXAM:   VITAL SIGNS:   Vitals:    01/16/21 0257   BP: 161/90   Pulse: 63   Resp: 19   Temp: 97.5 °F (36.4 °C)   SpO2: 97%      CONSTITUTIONAL: Awake, oriented, appears non-toxic   HENT: Atraumatic, normocephalic, oral mucosa pink and moist, airway patent. Nares patent without drainage. External ears normal.    EYES: Conjunctiva clear   NECK: Trachea midline, non-tender, supple   CARDIOVASCULAR: Normal heart rate, Normal rhythm, No murmurs, rubs, gallops   PULMONARY/CHEST: Clear to auscultation, no rhonchi, wheezes, or rales. Symmetrical breath sounds.  ABDOMINAL: Non-distended, soft, non-tender - no rebound or guarding.    NEUROLOGIC: Non-focal, moving all four extremities, no gross sensory or motor deficits.   EXTREMITIES: No clubbing, cyanosis, or edema   SKIN: Warm, Dry, No erythema, No rash     ED COURSE / MEDICAL DECISION MAKING:   Moe Hays is a 55 y.o. male who presents to the emergency department for evaluation of general fatigue and epigastric abdominal discomfort.  Patient is nondistressed on arrival in the emergency department.  Vital signs are stable.  Physical exam is largely unremarkable on arrival.  Will obtain labs and imaging for evaluation of patient's symptoms.    EKG on arrival interpreted by me reveals sinus rhythm with rate of 65 bpm.  There are some nonspecific intraventricular conduction delay.  Some nonspecific ST-T wave changes.  EKG slightly different but largely similar to previous EKG.  This is an abnormal appearing EKG.    Laboratory testing reveals a mildly elevated white blood cell count at 11 and a mildly elevated creatinine at 1.3.  Cardiac enzymes within the normal range as were Covid and influenza screens.  No sign of urinary infection.    Chest x-ray per my interpretation did not reveal any obvious acute cardiopulmonary process.    Send patient for CT scan of the abdomen and pelvis as well as chest which revealed findings consistent with mild diverticulitis.  This would explain patient's diarrhea, stomach upset, and feeling of general malaise.  There were no sign of pneumonia or pulmonary embolism on CT scan of the chest.  Patient felt some improved after IV fluids.  Will start antibiotic therapy.  Will discharge patient with strict return precautions.    DECISION TO  DISCHARGE/ADMIT: see ED care timeline     FINAL IMPRESSION:   1 --diverticulitis  2 --chest pain  3 --     Electronically signed by: Eloise Mcclendon MD, 1/16/2021 03:29 Eloise Melton MD  01/16/21 0659

## 2021-01-16 NOTE — ED NOTES
Pt had episode of sleep apnea, dropped 02 sats to 79%. Pt states that he wears a Cpap at night when sleeping. Pt placed on 2 L NC.      Nelia Cruz RN  01/16/21 0504

## 2021-03-15 ENCOUNTER — OFFICE VISIT (OUTPATIENT)
Dept: CARDIOLOGY | Facility: CLINIC | Age: 56
End: 2021-03-15

## 2021-03-15 VITALS
OXYGEN SATURATION: 96 % | WEIGHT: 295 LBS | HEART RATE: 63 BPM | HEIGHT: 76 IN | BODY MASS INDEX: 35.92 KG/M2 | DIASTOLIC BLOOD PRESSURE: 84 MMHG | SYSTOLIC BLOOD PRESSURE: 132 MMHG

## 2021-03-15 DIAGNOSIS — I10 ESSENTIAL HYPERTENSION: ICD-10-CM

## 2021-03-15 DIAGNOSIS — I48.3 TYPICAL ATRIAL FLUTTER (HCC): Primary | ICD-10-CM

## 2021-03-15 PROCEDURE — 99214 OFFICE O/P EST MOD 30 MIN: CPT | Performed by: INTERNAL MEDICINE

## 2021-03-15 PROCEDURE — 93000 ELECTROCARDIOGRAM COMPLETE: CPT | Performed by: INTERNAL MEDICINE

## 2021-03-15 RX ORDER — METOPROLOL SUCCINATE 25 MG/1
25 TABLET, EXTENDED RELEASE ORAL DAILY
Qty: 90 TABLET | Refills: 3 | Status: SHIPPED | OUTPATIENT
Start: 2021-03-15 | End: 2022-01-12

## 2021-03-15 NOTE — PROGRESS NOTES
[facility] AT [address], [address]  [place], [place], [address] [phone]  WWW[facility]           OUTPATIENT CLINIC PROGRESS NOTE    Patient care team:  Patient Care Team:  Josh Jain MD as PCP - General  Duc Chino MD as Surgeon (Neurosurgery)  John Wylie MD as Consulting Physician (Cardiology)      Subjective:   Chief complaint:   Chief Complaint   Patient presents with   • Typical atrial flutter       HPI:    Moe Hays is a 55 y.o. male.  Cardiac problem list:  1. Atrial flutter status post right-sided atrial flutter ablation, Dr. Wylie, 1/2020  2. Brief episode of atrial fibrillation noted on heart monitor 2019.  2 brothers with a history of atrial fibrillation  3. Hypertension  4. Hyperlipidemia  5. Obstructive sleep apnea  6. Chronic lower back pain.      He presents today for follow up    Since his ablation he has done well.  Denies recurrent palpitations.  Active without cardiac symptoms.  Denies chest pain.    Review of Systems:  Negative for chest pain, palpitations, lower extremity swelling dyspnea with exertion, orthopnea, PND, lightheadedness, syncope.       PFSH:  Patient Active Problem List   Diagnosis   • Degenerative disc disease, lumbar   • Lumbar radiculopathy   • Moderate obesity   • Physical deconditioning   • Palpitations   • Essential hypertension   • Mixed hyperlipidemia   • Typical atrial flutter (CMS/HCC)         Current Outpatient Medications:   •  baclofen (LIORESAL) 20 MG tablet, Take 20 mg by mouth As Needed., Disp: , Rfl:   •  cabergoline (DOSTINEX) 0.5 MG tablet, Take 0.5 mg by mouth 2 (Two) Times a Week., Disp: , Rfl: 0  •  dicyclomine (BENTYL) 20 MG tablet, Take 1 tablet by mouth Every 6 (Six) Hours As Needed (abdominal pain)., Disp: 10 tablet, Rfl: 0  •  esomeprazole (NexIUM) 40 MG capsule, Take 40 mg by mouth As Needed., Disp: , Rfl:   •  fenofibrate (TRICOR) 145 MG tablet, Take 145 mg by mouth  Daily., Disp: , Rfl:   •  IPAMORELIN ACETATE IJ, Inject 0.01 mL as directed. Once a day, 5 times a week.  Monday-Friday, Disp: , Rfl:   •  levoFLOXacin (LEVAQUIN) 750 MG tablet, Take 1 tablet by mouth Daily., Disp: 10 tablet, Rfl: 0  •  losartan (COZAAR) 100 MG tablet, Take 100 mg by mouth Daily. 200001, Disp: , Rfl: 1  •  metoprolol succinate XL (TOPROL-XL) 25 MG 24 hr tablet, Take 1 tablet by mouth Daily., Disp: 30 tablet, Rfl: 11  •  niacin (NIASPAN) 1000 MG CR tablet, Take 1,000 mg by mouth Every Night., Disp: , Rfl:   •  NON FORMULARY, 1 (One) Time Per Week. b-12 energy injection weekly , Disp: , Rfl:   •  omega-3 acid ethyl esters (LOVAZA) 1 G capsule, Take 1 g by mouth 2 (Two) Times a Day., Disp: , Rfl:   •  ondansetron ODT (ZOFRAN-ODT) 4 MG disintegrating tablet, Place 1 tablet on the tongue Every 8 (Eight) Hours As Needed for Nausea or Vomiting., Disp: 12 tablet, Rfl: 0  •  pravastatin (PRAVACHOL) 80 MG tablet, Take 80 mg by mouth Every Night., Disp: , Rfl:   •  Testosterone Cypionate (DEPOTESTOTERONE CYPIONATE) 200 MG/ML injection, Inject 200 mg into the appropriate muscle as directed by prescriber 2 (Two) Times a Week., Disp: , Rfl: 5    Allergies   Allergen Reactions   • Other Swelling     Cats, eye swellinig       Social History     Socioeconomic History   • Marital status:      Spouse name: Not on file   • Number of children: Not on file   • Years of education: Not on file   • Highest education level: Not on file   Tobacco Use   • Smoking status: Never Smoker   • Smokeless tobacco: Former User     Types: Chew   Substance and Sexual Activity   • Alcohol use: Yes     Alcohol/week: 1.0 standard drinks     Types: 1 Cans of beer per week     Comment: occas   • Drug use: No   • Sexual activity: Defer     Family History   Problem Relation Age of Onset   • Heart disease Father    • Aortic stenosis Father    • Heart disease Mother    • Atrial fibrillation Brother    • Atrial fibrillation Brother    • No  "Known Problems Brother          Objective:   Physical Exam:  /84 (BP Location: Right arm, Patient Position: Sitting)   Pulse 63   Ht 193 cm (76\")   Wt 134 kg (295 lb)   SpO2 96%   BMI 35.91 kg/m²   CONSTITUTIONAL: No acute distress, normal affect  RESPIRATORY: Normal effort. Clear to auscultation bilaterally without wheezing or rales  CARDIOVASCULAR: Regular rate and rhythm with normal S1 and S2. Without murmur, gallop or rub.  No carotid bruit bilaterally  PERIPHERAL VASCULAR: Normal radial pulse on the right        Labs:  BUN   Date Value Ref Range Status   01/16/2021 16 6 - 20 mg/dL Final     Creatinine   Date Value Ref Range Status   01/16/2021 1.32 (H) 0.76 - 1.27 mg/dL Final     Potassium   Date Value Ref Range Status   01/16/2021 3.4 (L) 3.5 - 5.2 mmol/L Final     ALT (SGPT)   Date Value Ref Range Status   01/16/2021 18 1 - 41 U/L Final     AST (SGOT)   Date Value Ref Range Status   01/16/2021 20 1 - 40 U/L Final     WBC   Date Value Ref Range Status   01/16/2021 11.39 (H) 3.40 - 10.80 10*3/mm3 Final     Hemoglobin   Date Value Ref Range Status   01/16/2021 15.8 13.0 - 17.7 g/dL Final     Hematocrit   Date Value Ref Range Status   01/16/2021 43.9 37.5 - 51.0 % Final     Platelets   Date Value Ref Range Status   01/16/2021 254 140 - 450 10*3/mm3 Final       No results found for: CHOL  Lab Results   Component Value Date    TRIG 1,030 (H) 02/12/2014     Lab Results   Component Value Date    HDL 40 02/12/2014     Lab Results   Component Value Date    LDL 54 02/12/2014     No components found for: LDLDIRECTC    Outside labs 8/29/2019  BUN 9  Creatinine 1.29  Sodium 142  Potassium 4.8  ALT 22  AST 22  TSH 1.86  Hematocrit 50  Hemoglobin 17.4  Platelets 272    Diagnostic Data:      ECG 12 Lead    Date/Time: 3/15/2021 10:36 AM  Performed by: Benji Dumont MD  Authorized by: Benji Dumont MD   Comparison: compared with previous ECG from 2/11/2020  Similar to previous ECG  Rhythm: sinus rhythm  Comments: " Incomplete right bundle, heart rate 62, , QTc 414            Remote GXT around 2000: Reportedly negative    Assessment and Plan:   Moe was seen today for palpitations and shortness of breath.    Diagnoses and all orders for this visit:    Atrial flutter  Incomplete right bundle branch block  -Status post ablation  -Continue metoprolol, refill order placed.  -We will hold off anticoagulation as previously recommended by electrophysiology  -Recommend yearly EKG    Essential hypertension  -Continue current medication    - Return if symptoms worsen or fail to improve.  Discussed various types of cardiac symptoms with the patient today.  Should they occur, he was advised to give us a call and schedule an appointment.  Otherwise recommend annual EKG with PCP for monitoring.    Benji Dumont MD, MSc, [facility]  Interventional Cardiology  [facility]

## 2022-01-12 RX ORDER — METOPROLOL SUCCINATE 25 MG/1
TABLET, EXTENDED RELEASE ORAL
Qty: 90 TABLET | Refills: 0 | Status: SHIPPED | OUTPATIENT
Start: 2022-01-12 | End: 2022-03-31

## 2022-03-21 ENCOUNTER — OFFICE VISIT (OUTPATIENT)
Dept: CARDIOLOGY | Facility: CLINIC | Age: 57
End: 2022-03-21

## 2022-03-21 VITALS
WEIGHT: 290 LBS | DIASTOLIC BLOOD PRESSURE: 78 MMHG | SYSTOLIC BLOOD PRESSURE: 126 MMHG | HEART RATE: 59 BPM | OXYGEN SATURATION: 96 % | HEIGHT: 76 IN | BODY MASS INDEX: 35.31 KG/M2

## 2022-03-21 DIAGNOSIS — I48.3 TYPICAL ATRIAL FLUTTER: Primary | ICD-10-CM

## 2022-03-21 DIAGNOSIS — I10 ESSENTIAL HYPERTENSION: ICD-10-CM

## 2022-03-21 DIAGNOSIS — E78.2 MIXED HYPERLIPIDEMIA: ICD-10-CM

## 2022-03-21 PROCEDURE — 99214 OFFICE O/P EST MOD 30 MIN: CPT | Performed by: INTERNAL MEDICINE

## 2022-03-21 PROCEDURE — 93000 ELECTROCARDIOGRAM COMPLETE: CPT | Performed by: INTERNAL MEDICINE

## 2022-03-21 RX ORDER — HYDROCHLOROTHIAZIDE 25 MG/1
TABLET ORAL DAILY
COMMUNITY
Start: 2022-03-06

## 2022-03-21 RX ORDER — TADALAFIL 10 MG/1
10 TABLET ORAL DAILY
COMMUNITY
Start: 2021-12-21

## 2022-03-21 NOTE — PROGRESS NOTES
[facility] AT [address], [address]  [place], [place], [address] [phone]  WW[facility]           OUTPATIENT CLINIC PROGRESS NOTE    Patient care team:  Patient Care Team:  Josh Jain MD as PCP - General  Duc Chino MD as Surgeon (Neurosurgery)  John Wylie MD as Consulting Physician (Cardiology)      Subjective:   Chief complaint:   Chief Complaint   Patient presents with   • Typical atrial flutter (CMS/HCC)       HPI:    Moe Hays is a 56 y.o. male.  Cardiac problem list:  1. Atrial flutter status post right-sided atrial flutter ablation, Dr. Wylie, 1/2020  2. Brief episode of atrial fibrillation noted on heart monitor 2019.  2 brothers with a history of atrial fibrillation  3. Hypertension  4. Hyperlipidemia  5. Obstructive sleep apnea  6. Chronic lower back pain.    7. Prostate cancer status post ablation 9/2021    He presents today for follow up    Since his ablation he has done well.  Denies recurrent palpitations.  Active without cardiac symptoms.  Denies chest pain.    Recently started on hydrochlorothiazide for his blood pressure.  He is uncertain who prescribed it.  He will investigate this at home    Review of Systems:  As noted in the HPI    PFSH:  Patient Active Problem List   Diagnosis   • Degenerative disc disease, lumbar   • Lumbar radiculopathy   • Moderate obesity   • Physical deconditioning   • Palpitations   • Essential hypertension   • Mixed hyperlipidemia   • Typical atrial flutter (HCC)         Current Outpatient Medications:   •  baclofen (LIORESAL) 20 MG tablet, Take 20 mg by mouth As Needed., Disp: , Rfl:   •  fenofibrate (TRICOR) 145 MG tablet, Take 145 mg by mouth Daily., Disp: , Rfl:   •  hydroCHLOROthiazide (HYDRODIURIL) 25 MG tablet, Daily., Disp: , Rfl:   •  losartan (COZAAR) 100 MG tablet, Take 100 mg by mouth Daily. 200001, Disp: , Rfl: 1  •  metoprolol succinate XL (TOPROL-XL) 25 MG 24 hr tablet, TAKE  "1 TABLET BY MOUTH EVERY DAY, Disp: 90 tablet, Rfl: 0  •  niacin (NIASPAN) 1000 MG CR tablet, Take 1,000 mg by mouth Every Night., Disp: , Rfl:   •  omega-3 acid ethyl esters (LOVAZA) 1 g capsule, Take 1 g by mouth 2 (Two) Times a Day., Disp: , Rfl:   •  ondansetron ODT (ZOFRAN-ODT) 4 MG disintegrating tablet, Place 1 tablet on the tongue Every 8 (Eight) Hours As Needed for Nausea or Vomiting., Disp: 12 tablet, Rfl: 0  •  pravastatin (PRAVACHOL) 80 MG tablet, Take 80 mg by mouth Every Night., Disp: , Rfl:   •  tadalafil (CIALIS) 10 MG tablet, Take 10 mg by mouth Daily., Disp: , Rfl:     Allergies   Allergen Reactions   • Other Swelling     Cats, eye swellinig       Social History     Socioeconomic History   • Marital status:    Tobacco Use   • Smoking status: Never Smoker   • Smokeless tobacco: Former User     Types: Chew     Quit date: 1999   Vaping Use   • Vaping Use: Never used   Substance and Sexual Activity   • Alcohol use: Yes     Alcohol/week: 12.0 standard drinks     Types: 12 Cans of beer per week     Comment: occas   • Drug use: No   • Sexual activity: Not Currently     Partners: Female     Birth control/protection: None     Family History   Problem Relation Age of Onset   • Heart disease Father            • Aortic stenosis Father    • Heart disease Mother    • Atrial fibrillation Brother    • Arrhythmia Brother    • Atrial fibrillation Brother    • Arrhythmia Brother    • No Known Problems Brother          Objective:   Physical Exam:  /78 (BP Location: Right arm, Patient Position: Sitting)   Pulse 59   Ht 193 cm (76\")   Wt 132 kg (290 lb)   SpO2 96%   BMI 35.30 kg/m²   CONSTITUTIONAL: No acute distress, normal affect  CARDIOVASCULAR: Regular rate and rhythm with normal S1 and S2. Without murmur, gallop or rub.  No carotid bruit bilaterally  PERIPHERAL VASCULAR: Normal radial pulse on the right    Labs:  BUN   Date Value Ref Range Status   2021 16 6 - 20 mg/dL Final "     Creatinine   Date Value Ref Range Status   01/16/2021 1.32 (H) 0.76 - 1.27 mg/dL Final     Potassium   Date Value Ref Range Status   01/16/2021 3.4 (L) 3.5 - 5.2 mmol/L Final     ALT (SGPT)   Date Value Ref Range Status   01/16/2021 18 1 - 41 U/L Final     AST (SGOT)   Date Value Ref Range Status   01/16/2021 20 1 - 40 U/L Final     WBC   Date Value Ref Range Status   01/16/2021 11.39 (H) 3.40 - 10.80 10*3/mm3 Final     Hemoglobin   Date Value Ref Range Status   01/16/2021 15.8 13.0 - 17.7 g/dL Final     Hematocrit   Date Value Ref Range Status   01/16/2021 43.9 37.5 - 51.0 % Final     Platelets   Date Value Ref Range Status   01/16/2021 254 140 - 450 10*3/mm3 Final       No results found for: CHOL  Lab Results   Component Value Date    TRIG 1,030 (H) 02/12/2014     Lab Results   Component Value Date    HDL 40 02/12/2014     Lab Results   Component Value Date    LDL 54 02/12/2014     No components found for: LDLDIRECTC    PCP labs 2/2022: , , HDL 51, , high-sensitivity CRP normal 2.3, creatinine 1.3, AST 14, ALT 16    Diagnostic Data:      ECG 12 Lead    Date/Time: 3/21/2022 11:04 AM  Performed by: Benji Dumont MD  Authorized by: Benji Dumont MD   Comparison: compared with previous ECG from 3/15/2021  Similar to previous ECG  Rhythm: sinus rhythm  Conduction: non-specific intraventricular conduction delay            Remote GXT around 2000: Reportedly negative    Assessment and Plan:     Atrial flutter  Incomplete right bundle branch block  -Status post ablation  -Continue metoprolol, refill order placed.  -We will hold off anticoagulation as previously recommended by electrophysiology  -Recommend yearly EKG    Essential hypertension  -Continue current medication    - Return in about 1 year (around 3/21/2023) for Next scheduled follow-up with an ECG.      Benji Dumont MD, MSc, FACC  Interventional Cardiology  [facility] at [facility]

## 2022-03-31 RX ORDER — METOPROLOL SUCCINATE 25 MG/1
TABLET, EXTENDED RELEASE ORAL
Qty: 90 TABLET | Refills: 0 | Status: SHIPPED | OUTPATIENT
Start: 2022-03-31 | End: 2022-06-27

## 2022-06-27 RX ORDER — METOPROLOL SUCCINATE 25 MG/1
TABLET, EXTENDED RELEASE ORAL
Qty: 90 TABLET | Refills: 0 | Status: SHIPPED | OUTPATIENT
Start: 2022-06-27 | End: 2022-09-23

## 2022-09-23 RX ORDER — METOPROLOL SUCCINATE 25 MG/1
TABLET, EXTENDED RELEASE ORAL
Qty: 90 TABLET | Refills: 3 | Status: SHIPPED | OUTPATIENT
Start: 2022-09-23

## 2023-04-25 ENCOUNTER — OFFICE VISIT (OUTPATIENT)
Dept: CARDIOLOGY | Facility: CLINIC | Age: 58
End: 2023-04-25
Payer: COMMERCIAL

## 2023-04-25 VITALS
DIASTOLIC BLOOD PRESSURE: 70 MMHG | BODY MASS INDEX: 35.68 KG/M2 | SYSTOLIC BLOOD PRESSURE: 116 MMHG | WEIGHT: 293 LBS | HEART RATE: 56 BPM | OXYGEN SATURATION: 96 % | HEIGHT: 76 IN

## 2023-04-25 DIAGNOSIS — I10 ESSENTIAL HYPERTENSION: ICD-10-CM

## 2023-04-25 DIAGNOSIS — I48.3 TYPICAL ATRIAL FLUTTER: Primary | ICD-10-CM

## 2023-04-25 DIAGNOSIS — E78.2 MIXED HYPERLIPIDEMIA: ICD-10-CM

## 2023-04-25 PROCEDURE — 99214 OFFICE O/P EST MOD 30 MIN: CPT | Performed by: INTERNAL MEDICINE

## 2023-04-25 PROCEDURE — 93000 ELECTROCARDIOGRAM COMPLETE: CPT | Performed by: INTERNAL MEDICINE

## 2023-04-25 RX ORDER — ALPRAZOLAM 1 MG/1
1 TABLET ORAL DAILY
COMMUNITY
Start: 2023-03-11

## 2023-04-25 RX ORDER — METOPROLOL SUCCINATE 25 MG/1
25 TABLET, EXTENDED RELEASE ORAL DAILY
Qty: 90 TABLET | Refills: 3 | Status: SHIPPED | OUTPATIENT
Start: 2023-04-25

## 2023-04-25 NOTE — PROGRESS NOTES
[facility] AT [address], [address]  [place], [address] [phone]  WWW[facility]           OUTPATIENT CLINIC PROGRESS NOTE    Patient care team:  Patient Care Team:  Josh Jain MD as PCP - General  Duc Chino MD as Surgeon (Neurosurgery)  John Wylie MD as Consulting Physician (Cardiology)      Subjective:   Chief complaint:   Chief Complaint   Patient presents with   • Typical atrial flutter       HPI:    Moe Hays is a 57 y.o. male.  Cardiac problem list:  1. Atrial flutter   1. Status post right-sided atrial flutter ablation, Dr. Wylie, 1/2020  2. Brief episode of atrial fibrillation noted on heart monitor 2019.    1. 2 brothers with a history of atrial fibrillation  3. Hypertension  4. Hyperlipidemia  5. Obstructive sleep apnea  6. Chronic lower back pain.    7. Prostate cancer status post ablation 9/2021    He presents today for follow up    Denies recurrent palpitations.  Active without cardiac symptoms.  Denies chest pain.  Not exercising regularly    Review of Systems:  As noted in the HPI    PFSH:  Patient Active Problem List   Diagnosis   • Degenerative disc disease, lumbar   • Lumbar radiculopathy   • Moderate obesity   • Physical deconditioning   • Palpitations   • Essential hypertension   • Mixed hyperlipidemia   • Typical atrial flutter         Current Outpatient Medications:   •  ALPRAZolam (XANAX) 1 MG tablet, Take 1 tablet by mouth Daily., Disp: , Rfl:   •  fenofibrate (TRICOR) 145 MG tablet, Take 1 tablet by mouth Daily., Disp: , Rfl:   •  hydroCHLOROthiazide (HYDRODIURIL) 25 MG tablet, Daily., Disp: , Rfl:   •  losartan (COZAAR) 100 MG tablet, Take 1 tablet by mouth Daily. 200001, Disp: , Rfl: 1  •  metoprolol succinate XL (TOPROL-XL) 25 MG 24 hr tablet, Take 1 tablet by mouth Daily., Disp: 90 tablet, Rfl: 3  •  niacin (NIASPAN) 1000 MG CR tablet, Take 1 tablet by mouth Every Night., Disp: , Rfl:   •  omega-3  "acid ethyl esters (LOVAZA) 1 g capsule, Take 1 capsule by mouth 2 (Two) Times a Day., Disp: , Rfl:   •  ondansetron ODT (ZOFRAN-ODT) 4 MG disintegrating tablet, Place 1 tablet on the tongue Every 8 (Eight) Hours As Needed for Nausea or Vomiting., Disp: 12 tablet, Rfl: 0  •  pravastatin (PRAVACHOL) 80 MG tablet, Take 1 tablet by mouth Every Night., Disp: , Rfl:   •  tadalafil (CIALIS) 10 MG tablet, Take 1 tablet by mouth Daily., Disp: , Rfl:     Allergies   Allergen Reactions   • Other Swelling     Cats, eye swellinig       Social History     Socioeconomic History   • Marital status:    Tobacco Use   • Smoking status: Never   • Smokeless tobacco: Former     Types: Chew     Quit date: 1999   Vaping Use   • Vaping Use: Never used   Substance and Sexual Activity   • Alcohol use: Yes     Alcohol/week: 12.0 standard drinks     Types: 12 Cans of beer per week     Comment: occas   • Drug use: No   • Sexual activity: Not Currently     Partners: Female     Birth control/protection: None     Family History   Problem Relation Age of Onset   • Heart disease Father            • Aortic stenosis Father    • Heart disease Mother    • Atrial fibrillation Brother    • Arrhythmia Brother    • Atrial fibrillation Brother    • Arrhythmia Brother    • No Known Problems Brother          Objective:   Physical Exam:  /70 (BP Location: Right arm, Patient Position: Sitting)   Pulse 56   Ht 193 cm (76\")   Wt 133 kg (293 lb)   SpO2 96%   BMI 35.67 kg/m²   CONSTITUTIONAL: No acute distress, normal affect  CARDIOVASCULAR: Regular rate and rhythm with normal S1 and S2. Without murmur  PERIPHERAL VASCULAR: No carotid bruit bilaterally. Normal radial pulse on the right    Labs:    No results found for: CHOL  Lab Results   Component Value Date    TRIG 1,030 (H) 2014     Lab Results   Component Value Date    HDL 40 2014     Lab Results   Component Value Date    LDL 54 2014     No components found for: " LDLDIRECTC    PCP labs 2/2022: , , HDL 51, , high-sensitivity CRP normal 2.3, creatinine 1.3, AST 14, ALT 16    Diagnostic Data:      ECG 12 Lead    Date/Time: 4/25/2023 11:15 AM  Performed by: Benji Dumont MD  Authorized by: Benji Dumont MD   Comparison: compared with previous ECG from 3/21/2022  Similar to previous ECG  Comparison to previous ECG: Incomplete left bundle branch block  Rhythm: sinus rhythm            Remote GXT around 2000: Reportedly negative    Assessment and Plan:     Atrial flutter  Incomplete right bundle branch block  -Status post ablation  -Continue metoprolol, refill order placed.  -Off anticoagulation as previously recommended by electrophysiology  -Recommend yearly EKG    Essential hypertension  -Continue current medication  -Encouraged heart healthy diet and exercise    - Return in about 1 year (around 4/25/2024) for Next scheduled follow-up with an ECG.      Benji Dumont MD, MSc, FACC  Interventional Cardiology  [facility]

## 2024-06-17 RX ORDER — METOPROLOL SUCCINATE 25 MG/1
25 TABLET, EXTENDED RELEASE ORAL DAILY
Qty: 90 TABLET | Refills: 0 | Status: SHIPPED | OUTPATIENT
Start: 2024-06-17

## (undated) DEVICE — LEX ELECTRO PHYSIOLOGY: Brand: MEDLINE INDUSTRIES, INC.

## (undated) DEVICE — ST INF PRI SMRTSTE 20DRP 2VLV 24ML 117

## (undated) DEVICE — DECANT BG O JET

## (undated) DEVICE — SET PRIMARY GRVTY 10DP MALE LL 104IN

## (undated) DEVICE — AVANTI + 5F STD W/GW: Brand: AVANTI

## (undated) DEVICE — TEMPERATURE ABLATION CATHETER: Brand: BLAZER® II XP

## (undated) DEVICE — LIMB HOLDER, WRIST/ANKLE: Brand: DEROYAL

## (undated) DEVICE — SI AVANTI+ 8F STD W/GW  NO OBT: Brand: AVANTI

## (undated) DEVICE — SI AVANTI+ 7F STD W/GW  NO OBT: Brand: AVANTI

## (undated) DEVICE — CANN NASL CO2 DIVIDED A/

## (undated) DEVICE — ST EXT IV SMARTSITE 2VLV SP M LL 5ML IV1

## (undated) DEVICE — CATH EP SUPRM QUADPOLAR JSN 5F 5MM 120CM

## (undated) DEVICE — CATH EP INQUIRY H CRV 7F 1-7-1MM 110CM

## (undated) DEVICE — SOL NACL 0.9PCT 1000ML